# Patient Record
Sex: MALE
[De-identification: names, ages, dates, MRNs, and addresses within clinical notes are randomized per-mention and may not be internally consistent; named-entity substitution may affect disease eponyms.]

---

## 2021-02-25 ENCOUNTER — NURSE TRIAGE (OUTPATIENT)
Dept: OTHER | Facility: CLINIC | Age: 86
End: 2021-02-25

## 2021-02-25 NOTE — TELEPHONE ENCOUNTER
Call received on [address]. Brief description of triage: Pt calling to ask questions about social distancing with others outside of home after receiving the covid vaccines. Provided information from the [employer] website. Do I need to social distance after being vaccinated? \"Yes. To protect yourself and others, follow these recommendations: Wear a mask over your nose and mouth and Stay at least 6 feet away from others\" and since wife lives in nursing facility \"keeping a safe space between yourself and other people who are not from your household\". Triage indicates for patient to: Home care    Care advice provided. Recommended using local department of health website for testing locations and up to date information. Caller verbalizes understanding, denies any other questions or concerns; instructed to call back for any new or worsening symptoms. Reason for Disposition   COVID-19 Prevention and Healthy Living, questions about    Answer Assessment - Initial Assessment Questions  1. MAIN CONCERN OR SYMPTOM:  \"What is your main concern right now? \" \"What question do you have? \" \"What's the main symptom you're worried about? \" (e.g., fever, pain, redness, swelling)      Questions regarding vaccines and social distancing    2. VACCINE: \"What vaccination did you receive? \" \"Is this your first or second shot? \" (e.g., none; Veola Rickers, other)        3. SYMPTOM ONSET: \"When did the *No Answer* begin? \" (e.g., not relevant; hours, days)       NA    4. SYMPTOM SEVERITY: \"How bad is it? \"       NA    5. FEVER: \"Is there a fever? \" If so, ask: \"What is it, how was it measured, and when did it start? \"       NA    6. PAST REACTIONS: \"Have you reacted to immunizations before? \" If so, ask: \"What happened? \"      NA    7. OTHER SYMPTOMS: \"Do you have any other symptoms? \"      NA    Protocols used: CORONAVIRUS (COVID-19) VACCINE QUESTIONS AND REACTIONS-ADULT-[place] no